# Patient Record
Sex: MALE | HISPANIC OR LATINO | Employment: FULL TIME | URBAN - METROPOLITAN AREA
[De-identification: names, ages, dates, MRNs, and addresses within clinical notes are randomized per-mention and may not be internally consistent; named-entity substitution may affect disease eponyms.]

---

## 2017-09-01 ENCOUNTER — OFFICE VISIT (OUTPATIENT)
Dept: INTERNAL MEDICINE | Facility: MEDICAL CENTER | Age: 27
End: 2017-09-01
Payer: COMMERCIAL

## 2017-09-01 ENCOUNTER — HOSPITAL ENCOUNTER (OUTPATIENT)
Dept: LAB | Facility: MEDICAL CENTER | Age: 27
End: 2017-09-01
Attending: STUDENT IN AN ORGANIZED HEALTH CARE EDUCATION/TRAINING PROGRAM
Payer: COMMERCIAL

## 2017-09-01 VITALS
SYSTOLIC BLOOD PRESSURE: 122 MMHG | TEMPERATURE: 98.1 F | BODY MASS INDEX: 28.45 KG/M2 | RESPIRATION RATE: 16 BRPM | WEIGHT: 177 LBS | OXYGEN SATURATION: 95 % | DIASTOLIC BLOOD PRESSURE: 85 MMHG | HEART RATE: 72 BPM | HEIGHT: 66 IN

## 2017-09-01 DIAGNOSIS — Z00.00 WELLNESS EXAMINATION: ICD-10-CM

## 2017-09-01 DIAGNOSIS — Z72.51 PROBLEMS RELATED TO HIGH-RISK SEXUAL BEHAVIOR: ICD-10-CM

## 2017-09-01 DIAGNOSIS — E66.3 OVERWEIGHT (BMI 25.0-29.9): ICD-10-CM

## 2017-09-01 LAB
ALBUMIN SERPL BCP-MCNC: 4.9 G/DL (ref 3.2–4.9)
ALBUMIN/GLOB SERPL: 1.5 G/DL
ALP SERPL-CCNC: 50 U/L (ref 30–99)
ALT SERPL-CCNC: 47 U/L (ref 2–50)
ANION GAP SERPL CALC-SCNC: 8 MMOL/L (ref 0–11.9)
AST SERPL-CCNC: 27 U/L (ref 12–45)
BASOPHILS # BLD AUTO: 1.4 % (ref 0–1.8)
BASOPHILS # BLD: 0.08 K/UL (ref 0–0.12)
BILIRUB SERPL-MCNC: 0.7 MG/DL (ref 0.1–1.5)
BUN SERPL-MCNC: 10 MG/DL (ref 8–22)
CALCIUM SERPL-MCNC: 10.3 MG/DL (ref 8.5–10.5)
CHLORIDE SERPL-SCNC: 102 MMOL/L (ref 96–112)
CO2 SERPL-SCNC: 27 MMOL/L (ref 20–33)
CREAT SERPL-MCNC: 0.88 MG/DL (ref 0.5–1.4)
EOSINOPHIL # BLD AUTO: 0.29 K/UL (ref 0–0.51)
EOSINOPHIL NFR BLD: 4.9 % (ref 0–6.9)
ERYTHROCYTE [DISTWIDTH] IN BLOOD BY AUTOMATED COUNT: 39.4 FL (ref 35.9–50)
GFR SERPL CREATININE-BSD FRML MDRD: >60 ML/MIN/1.73 M 2
GLOBULIN SER CALC-MCNC: 3.2 G/DL (ref 1.9–3.5)
GLUCOSE SERPL-MCNC: 95 MG/DL (ref 65–99)
HCT VFR BLD AUTO: 49.5 % (ref 42–52)
HGB BLD-MCNC: 16.9 G/DL (ref 14–18)
HIV 1+2 AB+HIV1 P24 AG SERPL QL IA: NON REACTIVE
IMM GRANULOCYTES # BLD AUTO: 0.03 K/UL (ref 0–0.11)
IMM GRANULOCYTES NFR BLD AUTO: 0.5 % (ref 0–0.9)
LYMPHOCYTES # BLD AUTO: 1.3 K/UL (ref 1–4.8)
LYMPHOCYTES NFR BLD: 22.1 % (ref 22–41)
MCH RBC QN AUTO: 28.9 PG (ref 27–33)
MCHC RBC AUTO-ENTMCNC: 34.1 G/DL (ref 33.7–35.3)
MCV RBC AUTO: 84.8 FL (ref 81.4–97.8)
MONOCYTES # BLD AUTO: 0.69 K/UL (ref 0–0.85)
MONOCYTES NFR BLD AUTO: 11.8 % (ref 0–13.4)
NEUTROPHILS # BLD AUTO: 3.48 K/UL (ref 1.82–7.42)
NEUTROPHILS NFR BLD: 59.3 % (ref 44–72)
NRBC # BLD AUTO: 0 K/UL
NRBC BLD AUTO-RTO: 0 /100 WBC
PLATELET # BLD AUTO: 265 K/UL (ref 164–446)
PMV BLD AUTO: 10.1 FL (ref 9–12.9)
POTASSIUM SERPL-SCNC: 4 MMOL/L (ref 3.6–5.5)
PROT SERPL-MCNC: 8.1 G/DL (ref 6–8.2)
RBC # BLD AUTO: 5.84 M/UL (ref 4.7–6.1)
SODIUM SERPL-SCNC: 137 MMOL/L (ref 135–145)
TREPONEMA PALLIDUM IGG+IGM AB [PRESENCE] IN SERUM OR PLASMA BY IMMUNOASSAY: NON REACTIVE
WBC # BLD AUTO: 5.9 K/UL (ref 4.8–10.8)

## 2017-09-01 PROCEDURE — 87591 N.GONORRHOEAE DNA AMP PROB: CPT

## 2017-09-01 PROCEDURE — 87389 HIV-1 AG W/HIV-1&-2 AB AG IA: CPT

## 2017-09-01 PROCEDURE — 86780 TREPONEMA PALLIDUM: CPT

## 2017-09-01 PROCEDURE — 87491 CHLMYD TRACH DNA AMP PROBE: CPT

## 2017-09-01 PROCEDURE — 99204 OFFICE O/P NEW MOD 45 MIN: CPT | Mod: GC | Performed by: INTERNAL MEDICINE

## 2017-09-01 PROCEDURE — 36415 COLL VENOUS BLD VENIPUNCTURE: CPT

## 2017-09-01 PROCEDURE — 80053 COMPREHEN METABOLIC PANEL: CPT

## 2017-09-01 PROCEDURE — 85025 COMPLETE CBC W/AUTO DIFF WBC: CPT

## 2017-09-01 ASSESSMENT — PAIN SCALES - GENERAL: PAINLEVEL: NO PAIN

## 2017-09-01 ASSESSMENT — PATIENT HEALTH QUESTIONNAIRE - PHQ9: CLINICAL INTERPRETATION OF PHQ2 SCORE: 0

## 2017-09-01 NOTE — PATIENT INSTRUCTIONS
Obesity  Obesity is having too much body fat and a body mass index (BMI) of 30 or more. BMI is a number that is based on your height and weight. BMI is usually figured out by your doctor during regular wellness visits. The number is an estimate of how much body fat you have. Obesity can happen if you eat more calories than you can burn with exercise or other activity. It can cause major health problems or emergencies.   HOME CARE  · Exercise and be active as told by your doctor. Try:  ¨ Using stairs when you can.  ¨ Parking farther away from store doors.  ¨ Gardening, biking, or walking.  · Eat healthy foods and drinks that are low in calories. Eat more fruits and vegetables.  · Limit fast food, sweets, and snack foods that are made with ingredients that are not natural (processed food).  · Eat smaller amounts of food.  · Keep a journal and write down what you eat every day. Websites can help with this.  · Avoid drinking alcohol. Drink more water and drinks that have no calories.  · Take vitamins and dietary pills (supplements) only as told by your doctor.  · Try going to weight-loss support groups or classes. These can help to lessen stress. Dietitians and counselors may also help.  GET HELP RIGHT AWAY IF:  · You have pain or tightness in your chest.  · You have trouble breathing or feel short of breath.  · You feel weak or have loss of feeling (numbness) in your legs.  · You feel confused or have trouble talking.  · You have sudden changes in your vision.     This information is not intended to replace advice given to you by your health care provider. Make sure you discuss any questions you have with your health care provider.     Document Released: 03/11/2013 Document Revised: 01/08/2016 Document Reviewed: 03/11/2013  [employer] Interactive Patient Education ©2016 [employer]

## 2017-09-01 NOTE — PROGRESS NOTES
New Patient to Establish      Reason to establish: new patient to establish primary care      CC: High risk sexual behavior, wellness examination, marijuana use    HPI:     Patient is a 26 year young male, is here to establish primary care with us. He wants to have screening done for sexually transmitted illnesses, as he had multiple sexual partners in the past,  although currently he  is in monogamous relationship with one girlfriend. He does not use any condoms. From his previous sexual partner he has got a 6-year-old daughter who is in 50% to 50% custody. Currently he is living with his parents and his girlfriend. He does endorse history of smoking marijuana for almost 10 years. He occasionally used to smoke cigarettes and drink on rare occasions once in 2-6 months. Graduated high school and works as a .    Currently he declined any symptoms of dysuria, fever, joint pains, urethral discharge, abdominal pain or diarrhea.           Patient Active Problem List    Diagnosis Date Noted   • Wellness examination 09/01/2017   • Overweight (BMI 25.0-29.9) 09/01/2017   • Problems related to high-risk sexual behavior 09/01/2017       No past medical history on file.    No current outpatient prescriptions on file.     No current facility-administered medications for this visit.        Allergies as of 09/01/2017   • (No Known Allergies)       Social History     Social History   • Marital status: Single     Spouse name: N/A   • Number of children: N/A   • Years of education: N/A     Occupational History   • Not on file.     Social History Main Topics   • Smoking status: Never Smoker   • Smokeless tobacco: Never Used   • Alcohol use Yes      Comment: Occasionally   • Drug use:      Types: Marijuana   • Sexual activity: Yes     Partners: Female      Comment: girlfriend take birth control      Other Topics Concern   • Not on file     Social History Narrative   • No narrative on file       Family History   Problem Relation  "Age of Onset   • Stroke Maternal Grandfather    • Diabetes Maternal Grandfather    • No Known Problems Mother    • No Known Problems Father        No past surgical history on file.    ROS: As per HPI. Additional pertinent symptoms as noted below.    No chest pain, shortness of breath, palpitations, headache, bladder or bowel disturbances    /85   Pulse 72   Temp 36.7 °C (98.1 °F)   Resp 16   Ht 1.676 m (5' 6\")   Wt 80.3 kg (177 lb)   SpO2 95%   BMI 28.57 kg/m²     Physical Exam  General: overweight male, afebrile,  Alert and oriented, No apparent distress.  Mild clubbing of the nails present, no pallor    Eyes: Pupils equal and reactive. No scleral icterus.    Throat: Clear no erythema or exudates noted. No leukoplakia    Neck: Supple. No lymphadenopathy noted. Thyroid not enlarged.    Lungs: Clear to auscultation and percussion bilaterally.    Cardiovascular: Regular rate and rhythm. No murmurs, rubs or gallops.    Abdomen:  Benign. No rebound or guarding noted.    Extremities: No clubbing, cyanosis, edema.    Skin: Clear. No rash or suspicious skin lesions noted.    Other: neurological exam normal  Mood and affect normal      Assessment and Plan    1. Problems related to high-risk sexual behavior  Patient is at risk of sexually transmitted illness due to multiple sexual partners in the past, although currently he declined any active systemic symptoms, and examination is unremarkable.    Plan  HIV panel,   urine chlamydia and gonorrhea screening  RPR for syphilis serology  Hep B surface antigen    2. Wellness examination  Clinical examination unremarkable except for mild clubbing of nails  Heart Sounds normal and lungs clear  He is overweight  Patient is counseled for safe sexual practices and quit smoking    3. Overweight (BMI 25.0-29.9)  Patient's weight is in the overweight category with BMI of 28.57  Is advised for healthy diet and exercises  Patient is motivated to reduce his weight 5-10 pounds by "  next visit 7-8 weeks from now      Risk Assessment (discuss potential complications a function of chronic problems): as discussed above  Patient has agreed to sign up for a comprehensive primary care pledge form.    Complexity (discuss number of co-morbidities): as above    Signed by: Mora Greene M.D.

## 2017-09-02 LAB
C TRACH DNA SPEC QL NAA+PROBE: NEGATIVE
N GONORRHOEA DNA SPEC QL NAA+PROBE: NEGATIVE
SPECIMEN SOURCE: NORMAL

## 2019-01-07 ENCOUNTER — OFFICE VISIT (OUTPATIENT)
Dept: URGENT CARE | Facility: PHYSICIAN GROUP | Age: 29
End: 2019-01-07
Payer: COMMERCIAL

## 2019-01-07 ENCOUNTER — HOSPITAL ENCOUNTER (OUTPATIENT)
Facility: MEDICAL CENTER | Age: 29
End: 2019-01-07
Attending: PHYSICIAN ASSISTANT
Payer: COMMERCIAL

## 2019-01-07 VITALS
DIASTOLIC BLOOD PRESSURE: 74 MMHG | SYSTOLIC BLOOD PRESSURE: 118 MMHG | HEIGHT: 66 IN | TEMPERATURE: 97.6 F | WEIGHT: 176 LBS | OXYGEN SATURATION: 96 % | HEART RATE: 72 BPM | BODY MASS INDEX: 28.28 KG/M2

## 2019-01-07 DIAGNOSIS — N48.89 PENILE PAIN: ICD-10-CM

## 2019-01-07 DIAGNOSIS — N48.1 BALANITIS: Primary | ICD-10-CM

## 2019-01-07 LAB
APPEARANCE UR: CLEAR
BILIRUB UR STRIP-MCNC: NORMAL MG/DL
C TRACH DNA SPEC QL NAA+PROBE: NEGATIVE
COLOR UR AUTO: YELLOW
GLUCOSE UR STRIP.AUTO-MCNC: NORMAL MG/DL
KETONES UR STRIP.AUTO-MCNC: NORMAL MG/DL
LEUKOCYTE ESTERASE UR QL STRIP.AUTO: NORMAL
N GONORRHOEA DNA SPEC QL NAA+PROBE: NEGATIVE
NITRITE UR QL STRIP.AUTO: NORMAL
PH UR STRIP.AUTO: 5.5 [PH] (ref 5–8)
PROT UR QL STRIP: NORMAL MG/DL
RBC UR QL AUTO: NORMAL
SP GR UR STRIP.AUTO: 1.03
SPECIMEN SOURCE: NORMAL
UROBILINOGEN UR STRIP-MCNC: 0.2 MG/DL

## 2019-01-07 PROCEDURE — 99204 OFFICE O/P NEW MOD 45 MIN: CPT | Performed by: PHYSICIAN ASSISTANT

## 2019-01-07 PROCEDURE — 87591 N.GONORRHOEAE DNA AMP PROB: CPT

## 2019-01-07 PROCEDURE — 87491 CHLMYD TRACH DNA AMP PROBE: CPT

## 2019-01-07 PROCEDURE — 87086 URINE CULTURE/COLONY COUNT: CPT

## 2019-01-07 PROCEDURE — 81002 URINALYSIS NONAUTO W/O SCOPE: CPT | Performed by: PHYSICIAN ASSISTANT

## 2019-01-07 RX ORDER — CLOTRIMAZOLE 1 %
CREAM (GRAM) TOPICAL
Qty: 1 TUBE | Refills: 0 | Status: SHIPPED | OUTPATIENT
Start: 2019-01-07 | End: 2023-08-01

## 2019-01-07 ASSESSMENT — ENCOUNTER SYMPTOMS
ABDOMINAL PAIN: 0
VOMITING: 0
COUGH: 0
SHORTNESS OF BREATH: 0
NAUSEA: 0
FLANK PAIN: 0
DIARRHEA: 0
FEVER: 0
CHILLS: 0
CONSTIPATION: 0

## 2019-01-07 NOTE — PATIENT INSTRUCTIONS
Balanitis  Balanitis is inflammation of the head of the penis (glans).   CAUSES   Balanitis has multiple causes, both infectious and noninfectious. Often balanitis is the result of poor personal hygiene, especially in uncircumcised males. Without adequate washing, viruses, bacteria, and yeast collect between the foreskin and the glans. This can cause an infection. Lack of air and irritation from a normal secretion called smegma contribute to the cause in uncircumcised males. Other causes include:  · Chemical irritation from the use of certain soaps and shower gels (especially soaps with perfumes), condoms, personal lubricants, petroleum jelly, spermicides, and fabric conditioners.  · Skin conditions, such as eczema, dermatitis, and psoriasis.  · Allergies to drugs, such as tetracycline and sulfa.  · Certain medical conditions, including liver cirrhosis, congestive heart failure, and kidney disease.  · Morbid obesity.  SIGNS AND SYMPTOMS   Symptoms may include:  · Discharge coming from under the foreskin.  · Tenderness.  · Itching and inability to get an erection (because of the pain).  · Redness and a rash.  · Sores on the glans and on the foreskin.  DIAGNOSIS  Diagnosis of balanitis is confirmed through a physical exam.  TREATMENT  The treatment is based on the cause of the balanitis. Treatment may include:  · Frequent cleansing.  · Keeping the glans and foreskin dry.  · Use of medicines such as creams, pain medicines, antibiotics, or medicines to treat fungal infections.  · Sitz baths.  If the irritation has caused a scar on the foreskin that prevents easy retraction, a circumcision may be recommended.   HOME CARE INSTRUCTIONS  · Sex should be avoided until the condition has cleared.  MAKE SURE YOU:  · Understand these instructions.  · Will watch your condition.  · Will get help right away if you are not doing well or get worse.  This information is not intended to replace advice given to you by your health care  provider. Make sure you discuss any questions you have with your health care provider.  Document Released: 05/06/2010 Document Revised: 12/23/2014 Document Reviewed: 06/09/2014  [employer] Interactive Patient Education © 2017 [employer]

## 2019-01-07 NOTE — PROGRESS NOTES
"Subjective:   Caden Gomez is a 28 y.o. male who presents for Penis Pain (About 10 days )        Groin Pain   This is a new problem. Episode onset: 10 days. The problem has been waxing and waning. Associated symptoms include urinary symptoms. Pertinent negatives include no abdominal pain, chills, coughing, fever, nausea, rash or vomiting. Nothing aggravates the symptoms. He has tried nothing for the symptoms.     Pt is uncircumcised and previous balanitis yeast infection 2/2 to partner yeast infection. Sexually active with one partner, asx. No previous hx of STI. No testicular pain or swelling. No constriction of foreskin, normal mobility. No hx of kidney stone.     Review of Systems   Constitutional: Negative for chills, fever and malaise/fatigue.   Respiratory: Negative for cough and shortness of breath.    Gastrointestinal: Negative for abdominal pain, constipation, diarrhea, nausea and vomiting.   Genitourinary: Positive for dysuria. Negative for flank pain, frequency, hematuria and urgency.   Skin: Negative for rash.   All other systems reviewed and are negative.      PMH:  has no past medical history on file.  MEDS:   Current Outpatient Prescriptions:   •  clotrimazole (LOTRIMIN) 1 % Cream, Apply to affected area twice a day until 7-10 days, Disp: 1 Tube, Rfl: 0  ALLERGIES: No Known Allergies  SURGHX: History reviewed. No pertinent surgical history.  SOCHX:  reports that he has never smoked. He has never used smokeless tobacco. He reports that he drinks alcohol. He reports that he uses drugs, including Marijuana.  Family History   Problem Relation Age of Onset   • Stroke Maternal Grandfather    • Diabetes Maternal Grandfather    • No Known Problems Mother    • No Known Problems Father         Objective:   /74 (BP Location: Left arm, Patient Position: Sitting, BP Cuff Size: Adult)   Pulse 72   Temp 36.4 °C (97.6 °F) (Temporal)   Ht 1.676 m (5' 6\")   Wt 79.8 kg (176 lb)   SpO2 96%   BMI 28.41 kg/m² "     Physical Exam   Constitutional: He is oriented to person, place, and time. He appears well-developed and well-nourished. No distress.   HENT:   Head: Normocephalic and atraumatic.   Eyes: Pupils are equal, round, and reactive to light. Conjunctivae are normal.   Cardiovascular: Normal rate and regular rhythm.    Pulmonary/Chest: Effort normal and breath sounds normal.   Genitourinary: Testes normal. Uncircumcised. No phimosis, paraphimosis, hypospadias, penile erythema or penile tenderness. No discharge found.         Neurological: He is alert and oriented to person, place, and time.   Skin: Skin is warm and dry.   Psychiatric: He has a normal mood and affect. His behavior is normal.   Vitals reviewed.     Lab Results   Component Value Date/Time    POCCOLOR Yellow 01/07/2019 09:40 AM    POCAPPEAR Clear 01/07/2019 09:40 AM    POCLEUKEST Neg 01/07/2019 09:40 AM    POCNITRITE Neg 01/07/2019 09:40 AM    POCUROBILIGE 0.2 01/07/2019 09:40 AM    POCPROTEIN Neg 01/07/2019 09:40 AM    POCURPH 5.5 01/07/2019 09:40 AM    POCBLOOD Trace 01/07/2019 09:40 AM    POCSPGRV 1.030 01/07/2019 09:40 AM    POCKETONES Neg 01/07/2019 09:40 AM    POCBILIRUBIN Neg 01/07/2019 09:40 AM    POCGLUCUA Neg 01/07/2019 09:40 AM              Assessment/Plan:     1. Balanitis  clotrimazole (LOTRIMIN) 1 % Cream   2. Penile pain  POCT Urinalysis    CHLAMYDIA/GC PCR URINE OR SWAB    URINE CULTURE(NEW)     Supportive care reviewed.  Educational handout on balanitis provided.  Patient will be notified of final culture results.  From intercourse until final GC chlamydia results are received.    Follow-up with primary care provider within 7-10 days.  If symptoms worsen or persist patient can contact me or return to clinic for follow-up.  Red flags and emergency room precautions discussed.  Patient appears understanding of information.

## 2019-01-09 LAB
BACTERIA UR CULT: NORMAL
SIGNIFICANT IND 70042: NORMAL
SITE SITE: NORMAL
SOURCE SOURCE: NORMAL

## 2019-09-06 ENCOUNTER — OFFICE VISIT (OUTPATIENT)
Dept: URGENT CARE | Facility: PHYSICIAN GROUP | Age: 29
End: 2019-09-06
Payer: COMMERCIAL

## 2019-09-06 VITALS
WEIGHT: 165.2 LBS | BODY MASS INDEX: 26.55 KG/M2 | TEMPERATURE: 98.3 F | HEART RATE: 83 BPM | HEIGHT: 66 IN | OXYGEN SATURATION: 92 % | DIASTOLIC BLOOD PRESSURE: 70 MMHG | SYSTOLIC BLOOD PRESSURE: 112 MMHG

## 2019-09-06 DIAGNOSIS — B07.0 PLANTAR WART: ICD-10-CM

## 2019-09-06 PROCEDURE — 99213 OFFICE O/P EST LOW 20 MIN: CPT | Performed by: PHYSICIAN ASSISTANT

## 2019-09-06 ASSESSMENT — ENCOUNTER SYMPTOMS
TINGLING: 0
SHORTNESS OF BREATH: 0
ABDOMINAL PAIN: 0
VOMITING: 0
FOCAL WEAKNESS: 0
DIARRHEA: 0
CHILLS: 0
COUGH: 0
NAUSEA: 0
SENSORY CHANGE: 0
FEVER: 0

## 2019-09-06 NOTE — PROGRESS NOTES
"Subjective:   Caden Gomez is a 28 y.o. male who presents for Foot Problem (R heel pain, difficulty walking, pain when applying pressure, x3 weeks )        Foot Problem   This is a new problem. The current episode started 1 to 4 weeks ago. Pertinent negatives include no abdominal pain, chills, coughing, fever, nausea, rash or vomiting.     Patient comes clinic complaining of last approximate 3 weeks of pain to left heel.  Notes change in skin concerning for raised callus.  Has been trying to trim back callus but did have some bleeding coming from it.  Notes some pain with weightbearing associated with the location denies deeper pain foot.  Denies fevers chills.  Denies redness.    Review of Systems   Constitutional: Negative for chills and fever.   Respiratory: Negative for cough and shortness of breath.    Gastrointestinal: Negative for abdominal pain, diarrhea, nausea and vomiting.   Musculoskeletal: Joint pain:  heel pain.   Skin: Negative for rash.   Neurological: Negative for tingling, sensory change and focal weakness.     No Known Allergies   Objective:   /70 (BP Location: Left arm, Patient Position: Sitting, BP Cuff Size: Adult)   Pulse 83   Temp 36.8 °C (98.3 °F) (Temporal)   Ht 1.676 m (5' 6\")   Wt 74.9 kg (165 lb 3.2 oz)   SpO2 92%   BMI 26.66 kg/m²   Physical Exam   Constitutional: He is oriented to person, place, and time. He appears well-developed and well-nourished. No distress.   HENT:   Head: Normocephalic and atraumatic.   Right Ear: External ear normal.   Left Ear: External ear normal.   Nose: Nose normal.   Eyes: Conjunctivae are normal. Right eye exhibits no discharge. Left eye exhibits no discharge. No scleral icterus.   Neck: Neck supple.   Pulmonary/Chest: Effort normal. No respiratory distress.   Musculoskeletal: Normal range of motion.        Feet:    Neurological: He is alert and oriented to person, place, and time. Coordination normal.   Skin: Skin is warm and dry. He is not " diaphoretic. No pallor.   Psychiatric: He has a normal mood and affect.   Nursing note and vitals reviewed.        Assessment/Plan:   1. Plantar wart  - REFERRAL TO PODIATRY  Supportive care is reviewed with patient/caregiver - recommend to utilize soaks, paring with scalpel, follow-up with podiatry, discussed using moleskin to build up around wart to avoid ambulatory pain  Return to clinic with lack of resolution or progression of symptoms.  F/u w/ podiatry  Differential diagnosis, natural history, supportive care, and indications for immediate follow-up discussed.

## 2020-03-18 ENCOUNTER — OFFICE VISIT (OUTPATIENT)
Dept: URGENT CARE | Facility: CLINIC | Age: 30
End: 2020-03-18
Payer: COMMERCIAL

## 2020-03-18 VITALS
SYSTOLIC BLOOD PRESSURE: 114 MMHG | TEMPERATURE: 97.7 F | OXYGEN SATURATION: 96 % | HEART RATE: 88 BPM | RESPIRATION RATE: 16 BRPM | DIASTOLIC BLOOD PRESSURE: 64 MMHG

## 2020-03-18 DIAGNOSIS — J06.9 UPPER RESPIRATORY TRACT INFECTION, UNSPECIFIED TYPE: ICD-10-CM

## 2020-03-18 PROCEDURE — 99214 OFFICE O/P EST MOD 30 MIN: CPT | Performed by: PHYSICIAN ASSISTANT

## 2020-03-18 NOTE — LETTER
March 18, 2020         Patient: Caden Gomez   YOB: 1990   Date of Visit: 3/18/2020           To Whom it May Concern:    Caden Gomez was seen in my clinic on 3/18/2020. He may return to work as of 3/19/2020.  He does not have any pulmonary infectious disease.    If you have any questions or concerns, please don't hesitate to call.        Sincerely,           Clem Terry P.A.-C.  Electronically Signed

## 2020-03-19 ASSESSMENT — ENCOUNTER SYMPTOMS
HEMOPTYSIS: 0
SHORTNESS OF BREATH: 0
PALPITATIONS: 0
SPUTUM PRODUCTION: 1
WHEEZING: 0
FEVER: 0
CHILLS: 0
SORE THROAT: 1
COUGH: 1

## 2020-03-19 NOTE — PROGRESS NOTES
Subjective:      Caden Gomez is a 29 y.o. male who presents with Cough (x's 4 days)            Cough   This is a new problem. The current episode started in the past 7 days. The problem has been resolved. The cough is non-productive. Associated symptoms include a sore throat. Pertinent negatives include no chest pain, chills, ear pain, fever, hemoptysis, shortness of breath or wheezing. Nothing aggravates the symptoms. He has tried OTC cough suppressant for the symptoms. The treatment provided significant relief.       Review of Systems   Constitutional: Positive for malaise/fatigue. Negative for chills and fever.   HENT: Positive for congestion and sore throat. Negative for ear pain.    Respiratory: Positive for cough and sputum production. Negative for hemoptysis, shortness of breath and wheezing.    Cardiovascular: Negative for chest pain and palpitations.   All other systems reviewed and are negative.    PMH:  has no past medical history on file.  MEDS:   Current Outpatient Medications:   •  clotrimazole (LOTRIMIN) 1 % Cream, Apply to affected area twice a day until 7-10 days (Patient not taking: Reported on 3/18/2020), Disp: 1 Tube, Rfl: 0  ALLERGIES: No Known Allergies  SURGHX: History reviewed. No pertinent surgical history.  SOCHX:  reports that he has never smoked. He has never used smokeless tobacco. He reports current alcohol use. He reports current drug use. Drug: Marijuana.  FH: Family history was reviewed, no pertinent findings to report  Medications, Allergies, and current problem list reviewed today in Epic         Objective:     Blood Pressure 114/64 (BP Location: Left arm, Patient Position: Sitting, BP Cuff Size: Adult)   Pulse 88   Temperature 36.5 °C (97.7 °F) (Temporal)   Respiration 16   Oxygen Saturation 96%      Physical Exam  Vitals signs reviewed.   Constitutional:       General: He is not in acute distress.     Appearance: He is well-developed. He is not ill-appearing or  toxic-appearing.      Interventions: He is not intubated.  HENT:      Head: Normocephalic and atraumatic.      Right Ear: Hearing, tympanic membrane, ear canal and external ear normal.      Left Ear: Hearing, tympanic membrane, ear canal and external ear normal.      Nose: Nose normal.      Mouth/Throat:      Pharynx: Uvula midline.   Eyes:      General: Lids are normal.      Conjunctiva/sclera: Conjunctivae normal.   Neck:      Musculoskeletal: Full passive range of motion without pain, normal range of motion and neck supple.   Cardiovascular:      Rate and Rhythm: Regular rhythm.      Heart sounds: Normal heart sounds, S1 normal and S2 normal. No murmur. No friction rub. No gallop.    Pulmonary:      Effort: Pulmonary effort is normal. No tachypnea, bradypnea, accessory muscle usage or respiratory distress. He is not intubated.      Breath sounds: Normal breath sounds. No decreased breath sounds, wheezing, rhonchi or rales.   Chest:      Chest wall: No tenderness.   Musculoskeletal: Normal range of motion.   Skin:     General: Skin is warm and dry.   Neurological:      Mental Status: He is alert and oriented to person, place, and time.   Psychiatric:         Speech: Speech normal.         Behavior: Behavior normal.         Thought Content: Thought content normal.         Judgment: Judgment normal.                 Assessment/Plan:       1. Upper respiratory tract infection, unspecified type  - work note  -OTC supportive care    Differential diagnosis, natural history, supportive care discussed. Follow-up with primary care provider within 7-10 days, emergency room precautions discussed.  Patient and/or family appears understanding of information.  Handout and review of patients diagnosis and treatment was discussed extensively.

## 2023-08-01 ENCOUNTER — OFFICE VISIT (OUTPATIENT)
Dept: MEDICAL GROUP | Facility: PHYSICIAN GROUP | Age: 33
End: 2023-08-01
Payer: COMMERCIAL

## 2023-08-01 VITALS
BODY MASS INDEX: 28.42 KG/M2 | OXYGEN SATURATION: 99 % | DIASTOLIC BLOOD PRESSURE: 86 MMHG | WEIGHT: 176.8 LBS | HEART RATE: 82 BPM | TEMPERATURE: 98 F | SYSTOLIC BLOOD PRESSURE: 118 MMHG | HEIGHT: 66 IN

## 2023-08-01 DIAGNOSIS — M25.511 ACUTE PAIN OF RIGHT SHOULDER: ICD-10-CM

## 2023-08-01 PROBLEM — Z72.51 PROBLEMS RELATED TO HIGH-RISK SEXUAL BEHAVIOR: Status: RESOLVED | Noted: 2017-09-01 | Resolved: 2023-08-01

## 2023-08-01 PROBLEM — S46.811A STRAIN OF RIGHT TRAPEZIUS MUSCLE: Status: ACTIVE | Noted: 2023-08-01

## 2023-08-01 PROCEDURE — 99203 OFFICE O/P NEW LOW 30 MIN: CPT | Performed by: STUDENT IN AN ORGANIZED HEALTH CARE EDUCATION/TRAINING PROGRAM

## 2023-08-01 PROCEDURE — 3074F SYST BP LT 130 MM HG: CPT | Performed by: STUDENT IN AN ORGANIZED HEALTH CARE EDUCATION/TRAINING PROGRAM

## 2023-08-01 PROCEDURE — 3079F DIAST BP 80-89 MM HG: CPT | Performed by: STUDENT IN AN ORGANIZED HEALTH CARE EDUCATION/TRAINING PROGRAM

## 2023-08-01 ASSESSMENT — ENCOUNTER SYMPTOMS
DEPRESSION: 0
FALLS: 0
BACK PAIN: 0
FEVER: 0
WEAKNESS: 0
TINGLING: 0
HEADACHES: 0
SHORTNESS OF BREATH: 0
FOCAL WEAKNESS: 0
COUGH: 0
CHILLS: 0
NECK PAIN: 1
SENSORY CHANGE: 0

## 2023-08-01 ASSESSMENT — PATIENT HEALTH QUESTIONNAIRE - PHQ9: CLINICAL INTERPRETATION OF PHQ2 SCORE: 0

## 2023-08-01 NOTE — PATIENT INSTRUCTIONS
Bring in labs to annual    I will be transferring to the clinic below 10/10/2023.  [facility]  [address] [place] [address]  [place] [address]     I will be happy to continue to be your primary care provider if you'd like to follow me and any existing appointments should automatically transfer. If not please call [phone] to establish care with new provider.

## 2023-08-01 NOTE — PROGRESS NOTES
"Subjective:     Chief Complaint   Patient presents with    Establish Care    Shoulder Pain     Right shoulder  X 3-4 weeks      HISTORY OF THE PRESENT ILLNESS: Patient is a 32 y.o. male. This pleasant patient is here today to establish care and discuss right shoulder pain. His/her prior PCP was Dr. Mora Greene.    Acute pain of right shoulder  Onset 3-4 weeks ago. No trauma. Does workout and plays basketball. Started in the shoulder above the clavicle and now more in the muscle along the shoulder radiating up to the neck. Max 7-8/10, average 4/10. Biofreeze, Tylenol and ibuprofen has helped.  Stretching in the morning helps.  Denies any distal numbness/tingling, no weakness.  Patient is right-hand dominant.    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  Does not do any lifting.    Health Maintenance: Reviewed with patient.    Review of Systems   Constitutional:  Negative for chills, fever and malaise/fatigue.   Respiratory:  Negative for cough and shortness of breath.    Cardiovascular:  Negative for chest pain.   Musculoskeletal:  Positive for joint pain and neck pain. Negative for back pain and falls.   Neurological:  Negative for tingling, sensory change, focal weakness, weakness and headaches.   Psychiatric/Behavioral:  Negative for depression.        Objective:     Exam: /86 (BP Location: Left arm, Patient Position: Sitting, BP Cuff Size: Adult)   Pulse 82   Temp 36.7 °C (98 °F) (Temporal)   Ht 1.676 m (5' 6\")   Wt 80.2 kg (176 lb 12.8 oz)   SpO2 99%  Body mass index is 28.54 kg/m².    Physical Exam  Vitals reviewed.   Constitutional:       General: He is not in acute distress.     Appearance: Normal appearance. He is not ill-appearing.   HENT:      Head: Normocephalic and atraumatic.      Nose: Nose normal.      Mouth/Throat:      Mouth: Mucous membranes are moist.   Cardiovascular:      Rate and Rhythm: Normal rate and regular rhythm.      Heart " sounds: Normal heart sounds. No murmur heard.  Pulmonary:      Effort: Pulmonary effort is normal. No respiratory distress.      Breath sounds: Normal breath sounds. No wheezing, rhonchi or rales.   Musculoskeletal:      Right shoulder: Tenderness (Tender along the middle trapezius extending to the right cervical paraspinals with some spasm) present. No swelling, deformity, effusion, bony tenderness or crepitus. Decreased range of motion (mild with abduction). Normal strength.      Cervical back: Normal range of motion and neck supple.      Comments: Negative Neermariya and Tommie Malin.   Skin:     General: Skin is warm and dry.   Neurological:      General: No focal deficit present.      Mental Status: He is alert and oriented to person, place, and time.      Sensory: No sensory deficit.      Motor: No weakness.      Gait: Gait normal.   Psychiatric:         Mood and Affect: Mood normal.         Behavior: Behavior normal.         Thought Content: Thought content normal.       Assessment & Plan:   32 y.o. male with the following -    1. Acute pain of right shoulder  This is an acute condition.  No indication for imaging at this time.  May represent overuse given that this is on his right side and patient works 9 hours at a computer.  Patient to be mindful of his ergonomics and identify any trigger.  Advised to continue over-the-counter topicals and may continue Tylenol/ibuprofen.  Advised trial of stretching, demonstrated some in clinic and provided with home physical therapy to start.  Advised trial of heat and if needed followed by ice.  May benefit from manual therapy/physical therapy, referred.  - Referral to Physical Therapy    Return in about 6 months (around 2/1/2024), or if symptoms worsen or fail to improve, for Annual.    Please note that this dictation was created using voice recognition software. I have made every reasonable attempt to correct obvious errors, but I expect that there are errors of  grammar and possibly content that I did not discover before finalizing the note.

## 2023-08-01 NOTE — ASSESSMENT & PLAN NOTE
Onset 3-4 weeks ago. No trauma. Does workout and plays basketball. Started in the shoulder above the clavicle and now more in the muscle along the shoulder radiating up to the neck. Max 7-8/10, average 4/10. Biofreeze, Tylenol and ibuprofen has helped.  Stretching in the morning helps.  Denies any distal numbness/tingling, no weakness.  Patient is right-hand dominant.    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  Does not do any lifting.

## 2024-09-06 ENCOUNTER — PATIENT MESSAGE (OUTPATIENT)
Dept: MEDICAL GROUP | Facility: LAB | Age: 34
End: 2024-09-06
Payer: COMMERCIAL

## 2024-09-06 DIAGNOSIS — Z30.09 VASECTOMY EVALUATION: ICD-10-CM

## 2024-11-05 ENCOUNTER — APPOINTMENT (OUTPATIENT)
Dept: UROLOGY | Facility: MEDICAL CENTER | Age: 34
End: 2024-11-05
Payer: COMMERCIAL

## 2024-11-12 ENCOUNTER — APPOINTMENT (OUTPATIENT)
Dept: UROLOGY | Facility: MEDICAL CENTER | Age: 34
End: 2024-11-12
Payer: COMMERCIAL

## 2024-11-12 DIAGNOSIS — Z30.09 VASECTOMY EVALUATION: ICD-10-CM

## 2024-11-12 PROCEDURE — 99202 OFFICE O/P NEW SF 15 MIN: CPT | Performed by: UROLOGY

## 2024-11-12 NOTE — PROGRESS NOTES
Chief Complaint: vasectomy evaluation    HPI: Caden Gomez is a 34 y.o. male here today to discuss a vasectomy. He has been thinking about this for a couple months; he and his wife had their third child 8 weeks ago. They had planned on a tubal ligation at the time of a , but as it turned out his wife had a vaginal delivery of their baby and so the ligation was not performed. Their older children are 14 and 4 years old, and doing well.     He has no history of scrotal pain, surgery, or trauma.     Past Medical History:  Past Medical History:   Diagnosis Date    Problems related to high-risk sexual behavior 2017       Past Surgical History:  Past Surgical History:   Procedure Laterality Date    DENTAL EXTRACTION(S)      wisdom teeth       Family History:  Family History   Problem Relation Age of Onset    Other Mother         prediabetes    No Known Problems Father     Accidental Death Brother         ETOH abuse/blunt force trauma    Heart Attack Maternal Grandmother 84    Stroke Maternal Grandfather     Diabetes Maternal Grandfather     Cancer Neg Hx        Social History:  Social History     Socioeconomic History    Marital status: Single     Spouse name: Not on file    Number of children: 2    Years of education: Not on file    Highest education level: Not on file   Occupational History    Not on file   Tobacco Use    Smoking status: Never    Smokeless tobacco: Never   Vaping Use    Vaping status: Some Days    Substances: THC   Substance and Sexual Activity    Alcohol use: Yes     Comment: average week 3-4 serving    Drug use: Yes     Types: Marijuana, Inhaled    Sexual activity: Yes     Partners: Female     Comment: Monogamous   Other Topics Concern    Not on file   Social History Narrative    Not on file     Social Drivers of Health     Financial Resource Strain: Not on file   Food Insecurity: Not on file   Transportation Needs: Not on file   Physical Activity: Not on file   Stress: Not on file    Social Connections: Not on file   Intimate Partner Violence: Not on file   Housing Stability: Not on file       Medications:  No current outpatient medications on file.     No current facility-administered medications for this visit.       Allergies:  No Known Allergies    Review of Systems:  Constitutional: Negative for fever, chills and malaise/fatigue.   HENT: Negative for congestion.    Eyes: Negative for pain.   Respiratory: Negative for cough and shortness of breath.    Cardiovascular: Negative for leg swelling.   Gastrointestinal: Negative for nausea, vomiting, abdominal pain and diarrhea.   Genitourinary: Negative for dysuria and hematuria.   Skin: Negative for rash.   Neurological: Negative for dizziness, focal weakness and headaches.   Endo/Heme/Allergies: Does not bruise/bleed easily.   Psychiatric/Behavioral: Negative for depression.  The patient is not nervous/anxious.        Physical Exam:  There were no vitals filed for this visit.    GENERAL: well appearing, well nourished, NAD  RESP: respiratory effort normal  ABDOMEN: soft, nontender, nondistended, no masses or organomegaly  HERNIAS: no hernias found on exam  SKIN/LYMPH: normal coloration and turgor, no suspicious skin lesions noted  NEURO/PSYCH: alert, oriented, normal speech, no focal findings or movement disorder noted  EXTREMITIES: no pedal edema noted  GENITOURINARY: normal male external genitalia, penis is normal, testes descended bilaterally, no testicular masses or scrotal swelling, and bilateral vas deferens are palpable.     Assessment: 34 y.o.male interested in permanent contraception, here to discuss vasectomy.    We discussed the nature of the procedure of a vasectomy, and risks including bleeding, hematoma, vasal recanalization, unplanned pregnancy, wound infection, and scrotal/testicular pain. I explained that he must have at least one negative semen analysis prior to resuming sexual activity without another form of contraception.  I also explained that a vasectomy does not protect against sexually transmitted diseases. Finally, I emphasized that a vasectomy is a permanent form of contraception, and though there are procedures intended to reverse the effects of a vasectomy these are not always effective. Therefore, this should be considered an irreversible form of contraception. He understands and would like to proceed.     Plan:    Schedule vasectomy    Peter Gardner MD

## 2024-12-05 ENCOUNTER — PROCEDURE VISIT (OUTPATIENT)
Dept: UROLOGY | Facility: MEDICAL CENTER | Age: 34
End: 2024-12-05
Payer: COMMERCIAL

## 2024-12-05 DIAGNOSIS — Z30.2 ENCOUNTER FOR VASECTOMY: ICD-10-CM

## 2024-12-05 PROCEDURE — 55250 REMOVAL OF SPERM DUCT(S): CPT | Performed by: UROLOGY

## 2024-12-05 RX ADMIN — Medication 20 ML: at 12:02

## 2024-12-05 NOTE — PROGRESS NOTES
Procedure Note: Vasectomy    Pre-Procedure Diagnosis: Desire for permanent contraception; Vasectomy Status    Post-Procedure Diagnosis: Same     Procedure Performed: Bilateral partial vasectomy     Surgeon: Peter Gardner MD    EBL: minimal    Complications: None     Specimens: None; bilateral vasal segments passed off field but not sent for pathology    Anesthesia: Local anesthesia    Procedure in Detail:   After obtaining informed consent, the patient was preped and draped in the standard sterile fashionion in the lower abdominal and genitalia.    Starting on the patient's right hemiscrotum, the vas deferens was identified and isolated. 1% lidocaine was used to anesthetize the skin and the perivasal tissue. A no-scalpel approach was used and the vas deferens was isolated with a vas clamp. The vasal tissue was carefully  and the vas deferens was brought out through the wound. Three small titanium clips were placed proximally and 3 placed distally on the vas deferens. The middle segment of vas deferens was then excised and passed off of the field. Intralumenal cautery was performed in both the proximal and distal segments of the vas. After obtaining excellent hemostasis, the distal vasal end was put back into the scrotum and an interposition flap of vasal sheath interposed. The proximal vasal end was then dropped back into the scrotum and the scrotum was closed in with 4-0 chromic suture. An identical procedure was performed on the patients left side. The wounds were then cleaned and covered with skin glue. Scrotal support and fluffs were applied. He tolerated the procedure well and was sent home with in stable condition. I gave him strict instructions that he must have at least one negative semen sample before engaging in unprotected intercourse or he is at risk for achieving a pregnancy. He will follow up in 3 months.

## 2025-02-24 ENCOUNTER — TELEPHONE (OUTPATIENT)
Dept: UROLOGY | Facility: MEDICAL CENTER | Age: 35
End: 2025-02-24
Payer: COMMERCIAL

## 2025-02-24 NOTE — TELEPHONE ENCOUNTER
I called Mr. Gomez today to discuss the results of his post vasectomy semen analysis, which identified 0 sperm. No answer so will send MyChart message today explaining the result.